# Patient Record
Sex: FEMALE | Race: WHITE | NOT HISPANIC OR LATINO | Employment: FULL TIME | URBAN - METROPOLITAN AREA
[De-identification: names, ages, dates, MRNs, and addresses within clinical notes are randomized per-mention and may not be internally consistent; named-entity substitution may affect disease eponyms.]

---

## 2023-09-06 ENCOUNTER — HOSPITAL ENCOUNTER (OUTPATIENT)
Dept: DATA CONVERSION | Facility: HOSPITAL | Age: 48
End: 2023-09-06

## 2023-09-06 DIAGNOSIS — Z12.31 ENCOUNTER FOR SCREENING MAMMOGRAM FOR MALIGNANT NEOPLASM OF BREAST: ICD-10-CM

## 2023-09-18 PROBLEM — N95.1 PERIMENOPAUSE: Status: ACTIVE | Noted: 2023-09-18

## 2023-09-18 PROBLEM — Z86.010 HISTORY OF COLON POLYPS: Status: ACTIVE | Noted: 2023-09-18

## 2023-09-18 PROBLEM — N92.6 IRREGULAR MENSES: Status: ACTIVE | Noted: 2023-09-18

## 2023-09-18 PROBLEM — I83.813 VARICOSE VEINS OF BOTH LOWER EXTREMITIES WITH PAIN: Status: ACTIVE | Noted: 2023-09-18

## 2023-09-18 PROBLEM — K57.30 DIVERTICULOSIS OF LARGE INTESTINE: Status: ACTIVE | Noted: 2023-09-18

## 2023-09-18 PROBLEM — R10.9 FLANK PAIN: Status: ACTIVE | Noted: 2023-09-18

## 2023-09-18 PROBLEM — R00.2 PALPITATIONS: Status: ACTIVE | Noted: 2023-09-18

## 2023-09-18 PROBLEM — M47.816 LUMBAR SPONDYLOSIS: Status: ACTIVE | Noted: 2023-09-18

## 2023-09-18 PROBLEM — M19.032 ARTHRITIS OF SCAPHOID-TRAPEZIUM-TRAPEZOID JOINT OF LEFT HAND: Status: ACTIVE | Noted: 2023-09-18

## 2023-09-18 PROBLEM — E78.2 MIXED HYPERLIPIDEMIA: Status: ACTIVE | Noted: 2023-09-18

## 2023-09-18 PROBLEM — Z86.0100 HISTORY OF COLON POLYPS: Status: ACTIVE | Noted: 2023-09-18

## 2023-09-18 PROBLEM — E78.1 HYPERTRIGLYCERIDEMIA: Status: ACTIVE | Noted: 2023-09-18

## 2023-09-18 PROBLEM — S39.012A LUMBAR STRAIN: Status: ACTIVE | Noted: 2023-09-18

## 2023-09-18 PROBLEM — I34.1 MITRAL VALVE PROLAPSE: Status: ACTIVE | Noted: 2023-09-18

## 2023-09-18 RX ORDER — CHOLECALCIFEROL (VITAMIN D3) 50 MCG
50 TABLET ORAL
COMMUNITY
End: 2024-01-03 | Stop reason: WASHOUT

## 2023-09-18 RX ORDER — MULTIVITAMIN WITH IRON
TABLET ORAL
COMMUNITY
End: 2024-01-03 | Stop reason: WASHOUT

## 2023-09-18 RX ORDER — LANOLIN ALCOHOL/MO/W.PET/CERES
CREAM (GRAM) TOPICAL
COMMUNITY
Start: 2022-10-15 | End: 2024-01-03 | Stop reason: WASHOUT

## 2023-09-18 RX ORDER — CELECOXIB 200 MG/1
200 CAPSULE ORAL
COMMUNITY
Start: 2023-03-23 | End: 2024-01-03 | Stop reason: WASHOUT

## 2023-09-18 RX ORDER — ASCORBIC ACID 500 MG
TABLET ORAL
COMMUNITY
End: 2024-01-03 | Stop reason: WASHOUT

## 2023-09-18 RX ORDER — FOLIC ACID 1 MG/1
TABLET ORAL
COMMUNITY
Start: 2022-10-15 | End: 2024-01-03 | Stop reason: WASHOUT

## 2023-09-18 RX ORDER — TIZANIDINE 4 MG/1
4 TABLET ORAL AS NEEDED
COMMUNITY
Start: 2023-03-23 | End: 2024-01-03 | Stop reason: WASHOUT

## 2023-09-18 RX ORDER — DIAPER,BRIEF,ADULT, DISPOSABLE
EACH MISCELLANEOUS
COMMUNITY
End: 2024-01-03 | Stop reason: WASHOUT

## 2023-09-18 RX ORDER — IRON SUCROSE 20 MG/ML
INJECTION, SOLUTION INTRAVENOUS
COMMUNITY
Start: 2022-10-15 | End: 2024-01-03 | Stop reason: WASHOUT

## 2023-10-11 ENCOUNTER — ANCILLARY PROCEDURE (OUTPATIENT)
Dept: RADIOLOGY | Facility: CLINIC | Age: 48
End: 2023-10-11
Payer: COMMERCIAL

## 2023-10-11 VITALS — BODY MASS INDEX: 24.83 KG/M2 | HEIGHT: 65 IN | WEIGHT: 149 LBS

## 2023-10-11 DIAGNOSIS — Z12.31 ENCOUNTER FOR SCREENING MAMMOGRAM FOR MALIGNANT NEOPLASM OF BREAST: ICD-10-CM

## 2023-10-11 PROCEDURE — 77063 BREAST TOMOSYNTHESIS BI: CPT | Mod: 50

## 2024-01-02 ENCOUNTER — TELEPHONE (OUTPATIENT)
Dept: OBSTETRICS AND GYNECOLOGY | Facility: CLINIC | Age: 49
End: 2024-01-02
Payer: COMMERCIAL

## 2024-01-02 NOTE — TELEPHONE ENCOUNTER
Pt calling has annual 04/2024. Patient states since Saturday she has had dull achy pain in her lower pelvic area, patient states it is centralized in the middle. Patient denies any UTI symptoms. Patients last LMP was in November but states it is not uncommon for them to be irregular. Patient states her partner had a vasectomy years ago. Patient denies any changes in her bowel movements. Please advise

## 2024-01-03 ENCOUNTER — OFFICE VISIT (OUTPATIENT)
Dept: OBSTETRICS AND GYNECOLOGY | Facility: CLINIC | Age: 49
End: 2024-01-03
Payer: COMMERCIAL

## 2024-01-03 VITALS
HEIGHT: 66 IN | DIASTOLIC BLOOD PRESSURE: 77 MMHG | BODY MASS INDEX: 25.2 KG/M2 | SYSTOLIC BLOOD PRESSURE: 123 MMHG | WEIGHT: 156.8 LBS

## 2024-01-03 DIAGNOSIS — Z11.51 SCREENING FOR HPV (HUMAN PAPILLOMAVIRUS): ICD-10-CM

## 2024-01-03 DIAGNOSIS — N95.1 PERIMENOPAUSE: ICD-10-CM

## 2024-01-03 DIAGNOSIS — R10.2 PELVIC PAIN: ICD-10-CM

## 2024-01-03 DIAGNOSIS — Z01.419 WELL WOMAN EXAM WITH ROUTINE GYNECOLOGICAL EXAM: Primary | ICD-10-CM

## 2024-01-03 DIAGNOSIS — N92.6 IRREGULAR MENSES: ICD-10-CM

## 2024-01-03 DIAGNOSIS — Z12.31 ENCOUNTER FOR SCREENING MAMMOGRAM FOR BREAST CANCER: ICD-10-CM

## 2024-01-03 PROCEDURE — 1036F TOBACCO NON-USER: CPT | Performed by: OBSTETRICS & GYNECOLOGY

## 2024-01-03 PROCEDURE — 87624 HPV HI-RISK TYP POOLED RSLT: CPT | Performed by: OBSTETRICS & GYNECOLOGY

## 2024-01-03 PROCEDURE — 88175 CYTOPATH C/V AUTO FLUID REDO: CPT | Mod: TC | Performed by: OBSTETRICS & GYNECOLOGY

## 2024-01-03 PROCEDURE — 99396 PREV VISIT EST AGE 40-64: CPT | Performed by: OBSTETRICS & GYNECOLOGY

## 2024-01-03 RX ORDER — BISMUTH SUBSALICYLATE 262 MG
1 TABLET,CHEWABLE ORAL DAILY
COMMUNITY

## 2024-01-03 ASSESSMENT — ENCOUNTER SYMPTOMS
COLOR CHANGE: 0
DYSURIA: 0
UNEXPECTED WEIGHT CHANGE: 0
CHEST TIGHTNESS: 0
SHORTNESS OF BREATH: 0
DIFFICULTY URINATING: 0
ACTIVITY CHANGE: 0
ABDOMINAL PAIN: 0
DIZZINESS: 0
FATIGUE: 0
LOSS OF SENSATION IN FEET: 0
ADENOPATHY: 0
HEADACHES: 0
DEPRESSION: 0
JOINT SWELLING: 0
ABDOMINAL DISTENTION: 0
WEAKNESS: 0
OCCASIONAL FEELINGS OF UNSTEADINESS: 0

## 2024-01-03 ASSESSMENT — LIFESTYLE VARIABLES
SKIP TO QUESTIONS 9-10: 0
HOW OFTEN DO YOU HAVE SIX OR MORE DRINKS ON ONE OCCASION: NEVER
HOW MANY STANDARD DRINKS CONTAINING ALCOHOL DO YOU HAVE ON A TYPICAL DAY: 3 OR 4
HOW OFTEN DO YOU HAVE A DRINK CONTAINING ALCOHOL: 2-3 TIMES A WEEK
AUDIT-C TOTAL SCORE: 4

## 2024-01-03 ASSESSMENT — PATIENT HEALTH QUESTIONNAIRE - PHQ9
1. LITTLE INTEREST OR PLEASURE IN DOING THINGS: SEVERAL DAYS
2. FEELING DOWN, DEPRESSED OR HOPELESS: SEVERAL DAYS
10. IF YOU CHECKED OFF ANY PROBLEMS, HOW DIFFICULT HAVE THESE PROBLEMS MADE IT FOR YOU TO DO YOUR WORK, TAKE CARE OF THINGS AT HOME, OR GET ALONG WITH OTHER PEOPLE: NOT DIFFICULT AT ALL
SUM OF ALL RESPONSES TO PHQ9 QUESTIONS 1 & 2: 2

## 2024-01-03 ASSESSMENT — PAIN SCALES - GENERAL: PAINLEVEL: 1

## 2024-01-03 NOTE — PROGRESS NOTES
"Annual  Subjective   Tanika Buckner is a 48 y.o. female who is here for annual exam w    Complaints: pos for almost 1 wk pelvic ache; reports a vague ache or pulling sensation over her old  incision with certain movements.  Interval history positive for rectal hemorrhaging requiring hospitalization, blood replacement ,and hemorrhoidal banding.  States no further episodes   last gyn exam 2022 for  new onset irregular menses=reg up til feb, then nothing til -.  FSH completed and elevated at 45.  Since then, menses have been coming approximately every 3 to 4 months; denies excessively heavy clotty or protracted vaginal bleeding.         Mother  menopausal early as 45/46, then stopped menses by 48.  Gyn History  Last pap  2020- BOTH pap/hpv neg;  2010 lgsil, hpv neg.   Mamm 2021 neg   Pelvic Ultrasound 2017 normal.   Birth control vasectomy.   Periods : see above; irreg since feb.   Menarche 14. LMP 2023, prev 2023  currently sexually active;same partner since ; denies exposure concerns.   Total pregnancies  2.   Pregnancy # 1:  2009 FEMALE \"MIRNA\" , Primary C/S.   Pregnancy # 2:   MALE \"GEORGINA PENA\", Repeat C/S.   Review of Systems   Constitutional:  Negative for activity change, fatigue and unexpected weight change.   Respiratory:  Negative for chest tightness and shortness of breath.    Cardiovascular:  Negative for chest pain and leg swelling.   Gastrointestinal:  Negative for abdominal distention and abdominal pain.   Genitourinary:  Negative for difficulty urinating, dysuria, genital sores, pelvic pain, vaginal bleeding, vaginal discharge and vaginal pain.   Musculoskeletal:  Negative for gait problem and joint swelling.   Skin:  Negative for color change and rash.   Neurological:  Negative for dizziness, weakness and headaches.   Hematological:  Negative for adenopathy.   Objective  Visit Vitals  /77   Ht 1.664 m (5' 5.5\")   Wt 71.1 kg (156 lb " 12.8 oz)   BMI 25.70 kg/m²   OB Status Perimenopausal   Smoking Status Never   BSA 1.81 m²       General:   Alert and oriented, in no acute distress   Neck: Supple. No visible thyromegaly.    Breast/Axilla: Normal to palpation bilaterally without masses, skin changes, or nipple discharge.    Abdomen: Soft, non-tender, without masses or organomegaly; Pfannenstiel incision appears well-healed.  There is no discoloration there is no palpable defect.  Patient displays no significant tenderness on exam today.   Vulva: Normal architecture without erythema, masses, or lesions.    Vagina: Normal mucosa without lesions, masses, or atrophy. No abnormal vaginal discharge.    Cervix: Normal without masses, lesions, or signs of cervicitis; Pap sent   Uterus: Normal, mobile, non-enlarged uterus   Adnexa: No palpable  masses or tenderness   Pelvic Floor normal   Psych Normal affect. Normal mood.    Assessment/Plan    Encounter Diagnoses   Name Primary?    Well woman exam with routine gynecological exam; grossly normal breast and GYN exams Yes    Screening for HPV (human papillomavirus); Pap sent     Pelvic pain; symptoms mild and improving; patient declines ultrasound at present but does agree to call if symptoms would persist or worsen.     Encounter for screening mammogram for breast cancer; order given     Perimenopause; known elevated FSH; family history of early menopause.  Reviewed typical transition.     Irregular menses; declines cycle regulation at present.  Agrees to call if any heavy or prolonged bleeding episodes.    Neela Watts MD

## 2024-01-18 LAB
CYTOLOGY CMNT CVX/VAG CYTO-IMP: NORMAL
HPV HR 12 DNA GENITAL QL NAA+PROBE: NEGATIVE
HPV HR GENOTYPES PNL CVX NAA+PROBE: NEGATIVE
HPV16 DNA SPEC QL NAA+PROBE: NEGATIVE
HPV18 DNA SPEC QL NAA+PROBE: NEGATIVE
LAB AP HPV GENOTYPE QUESTION: YES
LAB AP HPV HR: NORMAL
LABORATORY COMMENT REPORT: NORMAL
LMP START DATE: NORMAL
MENSTRUAL HX REPORTED: NORMAL
PATH REPORT.TOTAL CANCER: NORMAL

## 2024-04-10 ENCOUNTER — APPOINTMENT (OUTPATIENT)
Dept: OBSTETRICS AND GYNECOLOGY | Facility: CLINIC | Age: 49
End: 2024-04-10
Payer: COMMERCIAL

## 2024-04-25 ENCOUNTER — TELEPHONE (OUTPATIENT)
Dept: OBSTETRICS AND GYNECOLOGY | Facility: CLINIC | Age: 49
End: 2024-04-25
Payer: COMMERCIAL

## 2024-04-25 DIAGNOSIS — R10.2 PELVIC PAIN: Primary | ICD-10-CM

## 2024-04-25 DIAGNOSIS — Z80.41 FAMILY HISTORY OF OVARIAN CANCER: ICD-10-CM

## 2024-04-25 NOTE — TELEPHONE ENCOUNTER
Est pt last seen 01/04/2024 Annual / pt states that her sister was just dx with ovarian cancer / pt sisters MD advised advised to keep up with testing / Pt requesting test for ovarian ca / asked if she was inquiring about genetic testing / pt was not sure /pt thought that pap test can check for ovarian ca / explained pap test is for cervical cancer /  pt requesting message to Dr Watts for recommendation for any type of preventive testing / sister dx of ovarian ca added to pt hx / message to wc

## 2024-05-06 ASSESSMENT — PROMIS GLOBAL HEALTH SCALE
RATE_SOCIAL_SATISFACTION: GOOD
RATE_AVERAGE_FATIGUE: MILD
RATE_PHYSICAL_HEALTH: GOOD
RATE_MENTAL_HEALTH: GOOD
RATE_AVERAGE_PAIN: 1
CARRYOUT_SOCIAL_ACTIVITIES: VERY GOOD
EMOTIONAL_PROBLEMS: SOMETIMES
RATE_GENERAL_HEALTH: VERY GOOD
CARRYOUT_PHYSICAL_ACTIVITIES: COMPLETELY
RATE_QUALITY_OF_LIFE: VERY GOOD

## 2024-05-08 ENCOUNTER — HOSPITAL ENCOUNTER (OUTPATIENT)
Dept: RADIOLOGY | Facility: HOSPITAL | Age: 49
Discharge: HOME | End: 2024-05-08
Payer: COMMERCIAL

## 2024-05-08 ENCOUNTER — OFFICE VISIT (OUTPATIENT)
Dept: PRIMARY CARE | Facility: CLINIC | Age: 49
End: 2024-05-08
Payer: COMMERCIAL

## 2024-05-08 VITALS
OXYGEN SATURATION: 98 % | DIASTOLIC BLOOD PRESSURE: 70 MMHG | BODY MASS INDEX: 23.95 KG/M2 | HEIGHT: 66 IN | RESPIRATION RATE: 16 BRPM | HEART RATE: 83 BPM | SYSTOLIC BLOOD PRESSURE: 102 MMHG | WEIGHT: 149 LBS

## 2024-05-08 DIAGNOSIS — D50.0 IRON DEFICIENCY ANEMIA DUE TO CHRONIC BLOOD LOSS: ICD-10-CM

## 2024-05-08 DIAGNOSIS — Z13.6 SCREENING FOR CARDIOVASCULAR CONDITION: ICD-10-CM

## 2024-05-08 DIAGNOSIS — Z13.29 SCREENING FOR HYPOTHYROIDISM: ICD-10-CM

## 2024-05-08 DIAGNOSIS — K64.1 GRADE II HEMORRHOIDS: ICD-10-CM

## 2024-05-08 DIAGNOSIS — R10.2 PELVIC PAIN: ICD-10-CM

## 2024-05-08 DIAGNOSIS — Z80.41 FAMILY HISTORY OF OVARIAN CANCER: ICD-10-CM

## 2024-05-08 DIAGNOSIS — Z00.00 ROUTINE GENERAL MEDICAL EXAMINATION AT A HEALTH CARE FACILITY: Primary | ICD-10-CM

## 2024-05-08 PROCEDURE — 99396 PREV VISIT EST AGE 40-64: CPT | Performed by: INTERNAL MEDICINE

## 2024-05-08 PROCEDURE — 76830 TRANSVAGINAL US NON-OB: CPT | Performed by: RADIOLOGY

## 2024-05-08 PROCEDURE — 76856 US EXAM PELVIC COMPLETE: CPT | Performed by: RADIOLOGY

## 2024-05-08 PROCEDURE — 76856 US EXAM PELVIC COMPLETE: CPT

## 2024-05-08 RX ORDER — PSYLLIUM HUSK 0.4 G
2 CAPSULE ORAL DAILY
COMMUNITY

## 2024-05-08 ASSESSMENT — ENCOUNTER SYMPTOMS
BLOOD IN STOOL: 1
FATIGUE: 0
NERVOUS/ANXIOUS: 0
ARTHRALGIAS: 0
ABDOMINAL PAIN: 0
DEPRESSION: 0
FREQUENCY: 0
DIZZINESS: 0
CONSTIPATION: 1
SINUS PAIN: 0
HEADACHES: 0
VOMITING: 0
NAUSEA: 0
COUGH: 0
APPETITE CHANGE: 0
RHINORRHEA: 0
WEAKNESS: 0
LOSS OF SENSATION IN FEET: 0
OCCASIONAL FEELINGS OF UNSTEADINESS: 0
FEVER: 0
SHORTNESS OF BREATH: 0
JOINT SWELLING: 0
PALPITATIONS: 0
SLEEP DISTURBANCE: 0
BACK PAIN: 1
CHILLS: 0
DIARRHEA: 0
DIFFICULTY URINATING: 0
ROS GI COMMENTS: HEMORRHOIDS
SORE THROAT: 0
HEMATURIA: 0

## 2024-05-08 ASSESSMENT — PATIENT HEALTH QUESTIONNAIRE - PHQ9
1. LITTLE INTEREST OR PLEASURE IN DOING THINGS: NOT AT ALL
2. FEELING DOWN, DEPRESSED OR HOPELESS: NOT AT ALL
SUM OF ALL RESPONSES TO PHQ9 QUESTIONS 1 AND 2: 0

## 2024-05-08 ASSESSMENT — PAIN SCALES - GENERAL: PAINLEVEL: 0-NO PAIN

## 2024-05-08 NOTE — PROGRESS NOTES
"Subjective   Patient ID: Tanika Buckner is a 49 y.o. female who presents for Annual Exam. Overall she is feeling well. Her sister was recently diagnosed with ovarian cancer and patient underwent pelvic US this morning. Her last period was 8/2023. Hx of hemorrhoids and underwent banding. Still reports blood in stool. Takes metamucil daily for constipation. Family hx of breast cancer, grandmother. She pulled a muscle in her L lower back when rowing. Sees ophthalmologist and dentist regularly.    Diagnostics Reviewed: 10/2023 mammogram nml. 11/2022 colonoscopy showed large internal and external hemorrhoids, diverticulitis.  Labs Reviewed: 1/2024 PAP nml, HPV neg.         Review of Systems   Constitutional:  Negative for appetite change, chills, fatigue and fever.   HENT:  Negative for ear pain, rhinorrhea, sinus pain and sore throat.    Eyes:  Negative for visual disturbance.   Respiratory:  Negative for cough and shortness of breath.    Cardiovascular:  Negative for chest pain and palpitations.   Gastrointestinal:  Positive for blood in stool and constipation. Negative for abdominal pain, diarrhea, nausea and vomiting.        Hemorrhoids   Genitourinary:  Negative for difficulty urinating, frequency and hematuria.   Musculoskeletal:  Positive for back pain. Negative for arthralgias and joint swelling.   Skin:  Negative for rash.   Neurological:  Negative for dizziness, weakness and headaches.   Psychiatric/Behavioral:  Negative for sleep disturbance. The patient is not nervous/anxious.        Objective   /70   Pulse 83   Resp 16   Ht 1.664 m (5' 5.5\")   Wt 67.6 kg (149 lb)   LMP 08/04/2023   SpO2 98%   BMI 24.42 kg/m²     Physical Exam  HENT:      Right Ear: Tympanic membrane normal.      Left Ear: Tympanic membrane normal.      Mouth/Throat:      Pharynx: Oropharynx is clear. No oropharyngeal exudate.   Eyes:      Conjunctiva/sclera: Conjunctivae normal.      Pupils: Pupils are equal, round, and " reactive to light.   Neck:      Thyroid: No thyromegaly.      Vascular: No carotid bruit.   Cardiovascular:      Rate and Rhythm: Regular rhythm.      Heart sounds: Normal heart sounds.   Pulmonary:      Breath sounds: Normal breath sounds.   Abdominal:      Palpations: Abdomen is soft. There is no hepatomegaly.      Tenderness: There is no abdominal tenderness.   Musculoskeletal:      Right hip: Normal.      Left hip: Normal.      Right knee: Normal.      Left knee: Normal.      Right lower leg: No edema.      Left lower leg: No edema.   Lymphadenopathy:      Cervical: No cervical adenopathy.   Skin:     General: Skin is warm.      Comments: No suspicious moles   Neurological:      Mental Status: She is alert and oriented to person, place, and time.      Gait: Gait is intact.   Psychiatric:         Mood and Affect: Mood and affect normal.         Behavior: Behavior normal. Behavior is cooperative.         Cognition and Memory: Cognition normal.         Assessment/Plan   Diagnoses and all orders for this visit:  Grade II hemorrhoids  -     CBC and Auto Differential; Future  -     Comprehensive Metabolic Panel; Future  -     Vitamin B12; Future  -     Iron and TIBC; Future  -     Folate; Future  -     Ferritin; Future  -     TSH with reflex to Free T4 if abnormal; Future  -     Lipid Panel; Future  Iron deficiency anemia due to chronic blood loss  -     CBC and Auto Differential; Future  -     Comprehensive Metabolic Panel; Future  -     Vitamin B12; Future  -     Iron and TIBC; Future  -     Folate; Future  -     Ferritin; Future  -     TSH with reflex to Free T4 if abnormal; Future  -     Lipid Panel; Future  Routine general medical examination at a health care facility  -     CBC and Auto Differential; Future  -     Comprehensive Metabolic Panel; Future  -     Vitamin B12; Future  -     Iron and TIBC; Future  -     Folate; Future  -     Ferritin; Future  -     TSH with reflex to Free T4 if abnormal; Future  -      Lipid Panel; Future  Screening for cardiovascular condition  -     CBC and Auto Differential; Future  -     Comprehensive Metabolic Panel; Future  -     Vitamin B12; Future  -     Iron and TIBC; Future  -     Folate; Future  -     Ferritin; Future  -     TSH with reflex to Free T4 if abnormal; Future  -     Lipid Panel; Future  Screening for hypothyroidism  -     CBC and Auto Differential; Future  -     Comprehensive Metabolic Panel; Future  -     Vitamin B12; Future  -     Iron and TIBC; Future  -     Folate; Future  -     Ferritin; Future  -     TSH with reflex to Free T4 if abnormal; Future  -     Lipid Panel; Future  Family history of ovarian cancer  -     CancerNext-Expanded; Future  -     CBC and Auto Differential; Future  -     Comprehensive Metabolic Panel; Future  -     Vitamin B12; Future  -     Iron and TIBC; Future  -     Folate; Future  -     Ferritin; Future  -     TSH with reflex to Free T4 if abnormal; Future  -     Lipid Panel; Future      Scribe Attestation  By signing my name below, Carli FLYNN Scribe   attest that this documentation has been prepared under the direction and in the presence of Alpa Lin MD.

## 2024-05-15 ENCOUNTER — LAB (OUTPATIENT)
Dept: LAB | Facility: LAB | Age: 49
End: 2024-05-15
Payer: COMMERCIAL

## 2024-05-15 DIAGNOSIS — Z80.41 FAMILY HISTORY OF OVARIAN CANCER: ICD-10-CM

## 2024-05-15 DIAGNOSIS — D50.0 IRON DEFICIENCY ANEMIA DUE TO CHRONIC BLOOD LOSS: ICD-10-CM

## 2024-05-15 DIAGNOSIS — K64.1 GRADE II HEMORRHOIDS: ICD-10-CM

## 2024-05-15 DIAGNOSIS — Z13.29 SCREENING FOR HYPOTHYROIDISM: ICD-10-CM

## 2024-05-15 DIAGNOSIS — Z13.6 SCREENING FOR CARDIOVASCULAR CONDITION: ICD-10-CM

## 2024-05-15 DIAGNOSIS — Z00.00 ROUTINE GENERAL MEDICAL EXAMINATION AT A HEALTH CARE FACILITY: ICD-10-CM

## 2024-05-15 LAB
ALBUMIN SERPL-MCNC: 4.5 G/DL (ref 3.5–5)
ALP BLD-CCNC: 92 U/L (ref 35–125)
ALT SERPL-CCNC: 16 U/L (ref 5–40)
ANION GAP SERPL CALC-SCNC: 11 MMOL/L
AST SERPL-CCNC: 18 U/L (ref 5–40)
BASOPHILS # BLD AUTO: 0.03 X10*3/UL (ref 0–0.1)
BASOPHILS NFR BLD AUTO: 0.8 %
BILIRUB SERPL-MCNC: 0.3 MG/DL (ref 0.1–1.2)
BUN SERPL-MCNC: 13 MG/DL (ref 8–25)
CALCIUM SERPL-MCNC: 9.6 MG/DL (ref 8.5–10.4)
CHLORIDE SERPL-SCNC: 104 MMOL/L (ref 97–107)
CHOLEST SERPL-MCNC: 149 MG/DL (ref 133–200)
CHOLEST/HDLC SERPL: 2.4 {RATIO}
CO2 SERPL-SCNC: 26 MMOL/L (ref 24–31)
CREAT SERPL-MCNC: 0.9 MG/DL (ref 0.4–1.6)
EGFRCR SERPLBLD CKD-EPI 2021: 79 ML/MIN/1.73M*2
EOSINOPHIL # BLD AUTO: 0.08 X10*3/UL (ref 0–0.7)
EOSINOPHIL NFR BLD AUTO: 2.1 %
ERYTHROCYTE [DISTWIDTH] IN BLOOD BY AUTOMATED COUNT: 12.4 % (ref 11.5–14.5)
FERRITIN SERPL-MCNC: 13 NG/ML (ref 13–150)
FOLATE SERPL-MCNC: >20 NG/ML (ref 4.2–19.9)
GLUCOSE SERPL-MCNC: 111 MG/DL (ref 65–99)
HCT VFR BLD AUTO: 36.4 % (ref 36–46)
HDLC SERPL-MCNC: 63 MG/DL
HGB BLD-MCNC: 11.7 G/DL (ref 12–16)
IMM GRANULOCYTES # BLD AUTO: 0 X10*3/UL (ref 0–0.7)
IMM GRANULOCYTES NFR BLD AUTO: 0 % (ref 0–0.9)
IRON SATN MFR SERPL: 7 % (ref 12–50)
IRON SERPL-MCNC: 23 UG/DL (ref 30–160)
LDLC SERPL CALC-MCNC: 60 MG/DL (ref 65–130)
LYMPHOCYTES # BLD AUTO: 1.46 X10*3/UL (ref 1.2–4.8)
LYMPHOCYTES NFR BLD AUTO: 38.1 %
MCH RBC QN AUTO: 28.6 PG (ref 26–34)
MCHC RBC AUTO-ENTMCNC: 32.1 G/DL (ref 32–36)
MCV RBC AUTO: 89 FL (ref 80–100)
MONOCYTES # BLD AUTO: 0.29 X10*3/UL (ref 0.1–1)
MONOCYTES NFR BLD AUTO: 7.6 %
NEUTROPHILS # BLD AUTO: 1.97 X10*3/UL (ref 1.2–7.7)
NEUTROPHILS NFR BLD AUTO: 51.4 %
NRBC BLD-RTO: 0 /100 WBCS (ref 0–0)
PLATELET # BLD AUTO: 341 X10*3/UL (ref 150–450)
POTASSIUM SERPL-SCNC: 4.6 MMOL/L (ref 3.4–5.1)
PROT SERPL-MCNC: 6.8 G/DL (ref 5.9–7.9)
RBC # BLD AUTO: 4.09 X10*6/UL (ref 4–5.2)
SODIUM SERPL-SCNC: 141 MMOL/L (ref 133–145)
TIBC SERPL-MCNC: 348 UG/DL (ref 228–428)
TRIGL SERPL-MCNC: 129 MG/DL (ref 40–150)
TSH SERPL DL<=0.05 MIU/L-ACNC: 1.13 MIU/L (ref 0.27–4.2)
UIBC SERPL-MCNC: 325 UG/DL (ref 110–370)
VIT B12 SERPL-MCNC: 671 PG/ML (ref 211–946)
WBC # BLD AUTO: 3.8 X10*3/UL (ref 4.4–11.3)

## 2024-05-15 PROCEDURE — 83550 IRON BINDING TEST: CPT

## 2024-05-15 PROCEDURE — 82607 VITAMIN B-12: CPT

## 2024-05-15 PROCEDURE — 85025 COMPLETE CBC W/AUTO DIFF WBC: CPT

## 2024-05-15 PROCEDURE — 82746 ASSAY OF FOLIC ACID SERUM: CPT

## 2024-05-15 PROCEDURE — 80053 COMPREHEN METABOLIC PANEL: CPT

## 2024-05-15 PROCEDURE — 36415 COLL VENOUS BLD VENIPUNCTURE: CPT

## 2024-05-15 PROCEDURE — 82728 ASSAY OF FERRITIN: CPT

## 2024-05-15 PROCEDURE — 83540 ASSAY OF IRON: CPT

## 2024-05-15 PROCEDURE — 84443 ASSAY THYROID STIM HORMONE: CPT

## 2024-05-15 PROCEDURE — 80061 LIPID PANEL: CPT

## 2024-06-03 ENCOUNTER — TELEPHONE (OUTPATIENT)
Dept: OBSTETRICS AND GYNECOLOGY | Facility: CLINIC | Age: 49
End: 2024-06-03
Payer: COMMERCIAL

## 2024-06-03 ASSESSMENT — ENCOUNTER SYMPTOMS
MYALGIAS: 1
DIARRHEA: 0
CONSTIPATION: 0
ABDOMINAL PAIN: 1
HEADACHES: 0
BELCHING: 0
VOMITING: 0
ANOREXIA: 0
FLATUS: 1
HEMATOCHEZIA: 0
DYSURIA: 0
WEIGHT LOSS: 0
FEVER: 1
FREQUENCY: 0
HEMATURIA: 0
NAUSEA: 0
ARTHRALGIAS: 1

## 2024-06-03 NOTE — TELEPHONE ENCOUNTER
Est pt last seen 01/03/2024 /Annual / pt calling states that on Friday has mild pelvic pain /was doing yard work over the weekend and pelvic pain was severe / pt states that pelvic pain gone but still feel slight discomfort /  Pt had pelvic us 04/25/2024 neg  / pt concerned due to sister with ovarian cancer / pt also states that she is having painful intercourse / pt has not use lubricant recently / advised need for Lubricant / pt states that she feels that she was not dry at time of intercourse  / advised  message to Dr Watts

## 2024-06-06 ENCOUNTER — OFFICE VISIT (OUTPATIENT)
Dept: OBSTETRICS AND GYNECOLOGY | Facility: CLINIC | Age: 49
End: 2024-06-06
Payer: COMMERCIAL

## 2024-06-06 VITALS
BODY MASS INDEX: 25.56 KG/M2 | WEIGHT: 153.4 LBS | DIASTOLIC BLOOD PRESSURE: 60 MMHG | SYSTOLIC BLOOD PRESSURE: 120 MMHG | HEIGHT: 65 IN

## 2024-06-06 DIAGNOSIS — N94.2 VAGINISMUS: ICD-10-CM

## 2024-06-06 DIAGNOSIS — N94.10 DYSPAREUNIA IN FEMALE: Primary | ICD-10-CM

## 2024-06-06 DIAGNOSIS — N89.8 VAGINAL DRYNESS: ICD-10-CM

## 2024-06-06 DIAGNOSIS — R10.2 SUPRAPUBIC PAIN: ICD-10-CM

## 2024-06-06 DIAGNOSIS — R10.2 PELVIC PAIN: ICD-10-CM

## 2024-06-06 LAB
POC APPEARANCE, URINE: CLEAR
POC BILIRUBIN, URINE: NEGATIVE
POC BLOOD, URINE: ABNORMAL
POC COLOR, URINE: YELLOW
POC GLUCOSE, URINE: NEGATIVE MG/DL
POC KETONES, URINE: NEGATIVE MG/DL
POC LEUKOCYTES, URINE: ABNORMAL
POC NITRITE,URINE: NEGATIVE
POC PH, URINE: 6 PH
POC PROTEIN, URINE: NEGATIVE MG/DL
POC SPECIFIC GRAVITY, URINE: 1.02
POC UROBILINOGEN, URINE: 0.2 EU/DL

## 2024-06-06 PROCEDURE — 99214 OFFICE O/P EST MOD 30 MIN: CPT | Performed by: OBSTETRICS & GYNECOLOGY

## 2024-06-06 PROCEDURE — 81003 URINALYSIS AUTO W/O SCOPE: CPT | Performed by: OBSTETRICS & GYNECOLOGY

## 2024-06-06 PROCEDURE — 87086 URINE CULTURE/COLONY COUNT: CPT | Performed by: OBSTETRICS & GYNECOLOGY

## 2024-06-06 RX ORDER — BUTYROSPERMUM PARKII(SHEA BUTTER), SIMMONDSIA CHINENSIS (JOJOBA) SEED OIL, ALOE BARBADENSIS LEAF EXTRACT .01; 1; 3.5 G/100G; G/100G; G/100G
250 LIQUID TOPICAL 2 TIMES DAILY
COMMUNITY

## 2024-06-06 RX ORDER — MULTIVIT-MIN/IRON/FOLIC ACID/K 18-600-40
CAPSULE ORAL EVERY 24 HOURS
COMMUNITY

## 2024-06-06 RX ORDER — ESTRADIOL 0.1 MG/G
CREAM VAGINAL
Qty: 42 G | Refills: 1 | Status: SHIPPED | OUTPATIENT
Start: 2024-06-06

## 2024-06-06 ASSESSMENT — LIFESTYLE VARIABLES
HOW OFTEN DO YOU HAVE A DRINK CONTAINING ALCOHOL: 2-4 TIMES A MONTH
HOW OFTEN DO YOU HAVE SIX OR MORE DRINKS ON ONE OCCASION: NEVER
HOW MANY STANDARD DRINKS CONTAINING ALCOHOL DO YOU HAVE ON A TYPICAL DAY: 1 OR 2
AUDIT-C TOTAL SCORE: 2
SKIP TO QUESTIONS 9-10: 1

## 2024-06-06 ASSESSMENT — PAIN SCALES - GENERAL: PAINLEVEL: 0-NO PAIN

## 2024-06-06 ASSESSMENT — ENCOUNTER SYMPTOMS
LOSS OF SENSATION IN FEET: 0
DEPRESSION: 0
OCCASIONAL FEELINGS OF UNSTEADINESS: 0

## 2024-06-06 ASSESSMENT — PATIENT HEALTH QUESTIONNAIRE - PHQ9
1. LITTLE INTEREST OR PLEASURE IN DOING THINGS: NOT AT ALL
2. FEELING DOWN, DEPRESSED OR HOPELESS: NOT AT ALL
SUM OF ALL RESPONSES TO PHQ9 QUESTIONS 1 & 2: 0

## 2024-06-06 NOTE — PROGRESS NOTES
Tanika Buckner comes in for gyn concern of suprapubic pain--near old Pfannensteil incision/pain w insertion during sex, just past yr/dry.         EXAM:   Constitutional/general:Well developed/Well nourished/ BMI 25   Neuro/psych:  Oriented x 3;  Mood/affect-pleasant  ABD: NO palpable mass/defect along incision; non-tender today  Gyn:   External genitalia:no lesions/erythema  Urethra:nl  Vagina: pos vaginismus/able to relax w cueing; mucosa pale/thin; NO blood/exudate/lesions  Pelvic support: strong  Cervix: no blood/exudate/lesions  Uterus: nl size/shape  Adnexa: NO palp mass/tenderness today  Anus/perineum: nl  Answers submitted by the patient for this visit:  Abdominal Pain Questionnaire (Submitted on 6/3/2024)  Chief Complaint: Abdominal pain  Chronicity: new  Onset: in the past 7 days  Onset quality: gradual  Frequency: constantly  Episode duration: 4 Days  Progression since onset: gradually improving  Pain location: suprapubic region  Pain - numeric: 3/10  Pain quality: cramping, a sensation of fullness, sharp  Radiates to: back  anorexia: No  arthralgias: Yes  belching: No  constipation: No  diarrhea: No  dysuria: No  fever: Yes  flatus: Yes  frequency: No  headaches: No  hematochezia: No  hematuria: No  melena: No  myalgias: Yes  nausea: No  weight loss: No  vomiting: No  Aggravated by: nothing  Relieved by: nothing  Diagnostic workup: ultrasound  Office UA : pos trace leuks/blood; neg nitrates; will send culture      Encounter Diagnoses   Name Primary?    Dyspareunia in female; exam confirm mucosal atrophy; explained tissue changes are permanent and progressive=requires regular lube/moisturizing/rx tx ert; pt agreeable. Yes    Vaginismus; explained reflex response to pain/anticipation pain; expected to improve once mucosa adequately treated and coital comfort improves. Explained need for mechanical dilation.     Vaginal dryness; as above     Pelvic pain; now minimal; no mass or infection; possible abd wall  adhesions.     Suprapubic pain; mostly resolved; will send urine culture to complete jose gutierrez MD

## 2024-06-07 LAB — SCAN RESULT: NORMAL

## 2024-06-08 LAB — BACTERIA UR CULT: NO GROWTH

## 2024-06-20 ENCOUNTER — TELEPHONE (OUTPATIENT)
Dept: OBSTETRICS AND GYNECOLOGY | Facility: CLINIC | Age: 49
End: 2024-06-20
Payer: COMMERCIAL

## 2024-06-20 NOTE — TELEPHONE ENCOUNTER
Patient left  with update regarding lower abd pain she was having. States she was starting to feel better then Saturday the pain came back, Tuesday it was really intense. Her PCP advised US but one was already done and she can't get into GI until September. She wanted to know if you had any other suggestions for her?

## 2024-09-04 ENCOUNTER — APPOINTMENT (OUTPATIENT)
Dept: GASTROENTEROLOGY | Facility: CLINIC | Age: 49
End: 2024-09-04
Payer: COMMERCIAL

## 2024-09-04 DIAGNOSIS — Z12.11 ENCOUNTER FOR SCREENING COLONOSCOPY: ICD-10-CM

## 2024-09-04 DIAGNOSIS — K64.8 OTHER HEMORRHOIDS: ICD-10-CM

## 2024-09-04 DIAGNOSIS — K57.30 DIVERTICULOSIS OF LARGE INTESTINE WITHOUT HEMORRHAGE: ICD-10-CM

## 2024-09-04 DIAGNOSIS — K92.1 HEMATOCHEZIA: Primary | ICD-10-CM

## 2024-09-04 PROCEDURE — 99213 OFFICE O/P EST LOW 20 MIN: CPT | Performed by: STUDENT IN AN ORGANIZED HEALTH CARE EDUCATION/TRAINING PROGRAM

## 2024-09-04 NOTE — PROGRESS NOTES
Chief Complaint:  Chief Complaint   Patient presents with    Hematochezia       Called for follow-up. Last seen > 1 yr ago.     Has flares of abdominal pain with bleeding. No recent episodes.    Described as central abdominal pain with associated bleeding described as bright red blood and large volume  Denies hard stool, straining or pain w defecation.   Saw GYN in June for pelvic pain and dyspareunia     BM daily, bristol 4,   Rare excessive toilet time    Hx of hemorrhoids s/p banding  Colonoscopy  2022 for hematochezia. BBPS 9, diverticulosis and large IH/EH bleeding.   2018 w no polyps but hx of TA.       Virtual or Telephone Consent    An interactive audio and video telecommunication system which permits real time communications between the patient (at the originating site) and provider (at the distant site) was utilized to provide this telehealth service.   Verbal consent was requested and obtained from Tanika Buckner on this date, 09/04/24 for a telehealth visit.                  Review of Systems   Constitutional:  Negative for chills, fever and unexpected weight change.   HENT:  Negative for congestion and trouble swallowing.    Respiratory:  Negative for cough, shortness of breath and wheezing.    Cardiovascular:  Negative for chest pain.   Gastrointestinal:  Positive for abdominal pain and anal bleeding. Negative for abdominal distention, constipation, diarrhea, nausea and vomiting.   Genitourinary:  Negative for difficulty urinating.   Musculoskeletal:  Negative for arthralgias and joint swelling.   Skin:  Negative for color change.   Neurological:  Negative for dizziness, speech difficulty, light-headedness and headaches.   Psychiatric/Behavioral:  Negative for confusion and sleep disturbance.      Family History   Problem Relation Name Age of Onset    Hypertension Mother Hoda Royal     Other (varicose veins) Mother Hoda Royal     Other (open heart surgery) Mother Hoda Royal 46        x2 SHANNON     "Atrial fibrillation Mother Hoda Royal     Miscarriages / Stillbirths Mother Hoda Royal     Other (varicose veins) Father      Skin cancer Father      Cancer Sister Danya Cruz     Asthma Sister Danya Cruz     Ovarian cancer Sister Danya Cruz         dx 04/2024    Other (ovarian cancer) Sister Danya Cruz     Breast cancer Paternal Grandmother Shaquille Royal 45    Prostate cancer Paternal Grandfather      Arthritis Other grandfather     Breast cancer Other grandmother     Hypertension Other grandmother     Cancer Sister Danya        Medications    Current Outpatient Medications:     ascorbic acid, vitamin C, 500 mg capsule, Take by mouth once every 24 hours., Disp: , Rfl:     estradiol (Estrace) 0.01 % (0.1 mg/gram) vaginal cream, Apply pea sized amount cream to bottom of vaginal opening each night for 2 weeks,  then at bedtime twice a week., Disp: 42 g, Rfl: 1    multivitamin tablet, Take 1 tablet by mouth once daily. Numerous pill pack., Disp: , Rfl:     psyllium (Metamucil) 0.4 gram capsule, Take 2 capsules by mouth once daily., Disp: , Rfl:     saccharomyces boulardii (Florastor) 250 mg capsule, Take 1 capsule (250 mg) by mouth 2 times a day., Disp: , Rfl:     Vitals  There were no vitals taken for this visit.    Physical Exam  Constitutional:       General: She is not in acute distress.     Appearance: Normal appearance.   HENT:      Head: Normocephalic.      Nose: Nose normal.   Eyes:      General: No scleral icterus.     Extraocular Movements: Extraocular movements intact.      Conjunctiva/sclera: Conjunctivae normal.   Pulmonary:      Effort: Pulmonary effort is normal.   Skin:     General: Skin is warm.      Coloration: Skin is not jaundiced.   Neurological:      Mental Status: She is alert.   Psychiatric:         Mood and Affect: Mood normal.           Labs:  No results found for: \"AFP\"No results found for: \"ASMAB\", \"MITOAB\"No results found for: \"LUISITO\"No results found for: \"ASMAB\", \"MITOAB\"  Lab " "Results   Component Value Date    MSJRVMDQ07 671 05/15/2024   No results found for: \"HEPCAB\"No results found for: \"HEPATOT\", \"HEPAIGM\", \"HEPBCIGM\", \"HEPBCAB\", \"HBEAG\", \"HEPCAB\"No results found for: \"HIV1X2\"  Lab Results   Component Value Date    IRON 23 (L) 05/15/2024    TIBC 348 05/15/2024    FERRITIN 13 05/15/2024   No results found for: \"INR\", \"PROTIME\"  Lab Results   Component Value Date    TSH 1.13 05/15/2024       Radiology  US PELVIS TRANSABDOMINAL WITH TRANSVAGINAL  Narrative: Interpreted By:  Chet Jay,   STUDY:  US PELVIS TRANSABDOMINAL WITH TRANSVAGINAL;  5/8/2024 9:23 am      INDICATION:  Signs/Symptoms:pelvic pain.      COMPARISON:  03/28/2017      ACCESSION NUMBER(S):  [id number]      ORDERING CLINICIAN:  KELLY LOVING      TECHNIQUE:  Multiple multiplanar static gray scale, color and spectral waveform  sonographic images of the pelvis were obtained.  Transabdominal and  endovaginal ultrasound was performed.      FINDINGS:  UTERUS:  The uterus measures 4.1 cm x 3.5 cm x 6.9 cmcm in greatest  transverse, AP, and sagittal dimensions. No discrete myometrial mass  identified on the provided images.      ENDOMETRIUM:  The central endometrial complex measures  0.2 cm cm in dual layer  thickness.  No fluid noted within the endometrial cavity.      RIGHT ADNEXA:  The right ovary measures  2.3 cm x 1.1 cm x 1.8 cmcm and demonstrates  flow on Doppler imaging.  It appears sonographically unremarkable.      LEFT ADNEXA:  The left ovary measures  1.8 cm x 0.7 cm x 1.8 cm cm and demonstrates  flow on Doppler imaging.  It appears sonographically unremarkable.      CUL DE SAC:  No significant free fluid.      Impression: Unremarkable pelvic ultrasound.      MACRO:  None.      Signed by: Chet Jay 5/8/2024 9:29 AM  Dictation workstation:   [id number]      A/P   Tanika was seen today for hematochezia.  Diagnoses and all orders for this visit:  Hematochezia (Primary)  Other hemorrhoids  Encounter for screening " colonoscopy  Diverticulosis of large intestine without hemorrhage     Problem List Items Addressed This Visit             ICD-10-CM    Diverticulosis of large intestine K57.30     Sigmoid diverticulosis seen on most recent colonoscopy. Asymptomatic   - discussed management of diverticulosis including avoiding constipation and adhering to high fiber diet. Do not need to avoid nuts/seeds/beans/corn   - discussed complications related to diverticulosis including diverticulitis and diverticular bleeding.   - patient can call the office if concerning symptoms          Other hemorrhoids K64.8     High fiber diet         Hematochezia - Primary K92.1     Intermittent rectal bleeding w periumbilical abdominal pain. Colon 2022 for rectal bleeding w diverticulosis and bleeding hemorrhoids, s/p banding.  DDX include diverticular vs hemorrhoids vs small bowel etiology  - high fiber diet  - avoid constipation  - if persistent or more frequent consider flexible sigmoidoscopy and/or VCE         Encounter for screening colonoscopy Z12.11     11/2022 and 2018 with no polyps  - recall 10 yrs --> 11/2032

## 2024-09-10 PROBLEM — Z12.11 ENCOUNTER FOR SCREENING COLONOSCOPY: Status: ACTIVE | Noted: 2024-09-10

## 2024-09-10 PROBLEM — K92.1 HEMATOCHEZIA: Status: ACTIVE | Noted: 2024-09-10

## 2024-09-10 PROBLEM — K64.8 OTHER HEMORRHOIDS: Status: ACTIVE | Noted: 2024-09-10

## 2024-09-10 ASSESSMENT — ENCOUNTER SYMPTOMS
ANAL BLEEDING: 1
CONSTIPATION: 0
DIFFICULTY URINATING: 0
ABDOMINAL PAIN: 1
SHORTNESS OF BREATH: 0
ABDOMINAL DISTENTION: 0
DIARRHEA: 0
ARTHRALGIAS: 0
VOMITING: 0
FEVER: 0
HEADACHES: 0
JOINT SWELLING: 0
UNEXPECTED WEIGHT CHANGE: 0
NAUSEA: 0
COUGH: 0
TROUBLE SWALLOWING: 0
LIGHT-HEADEDNESS: 0
DIZZINESS: 0
SPEECH DIFFICULTY: 0
COLOR CHANGE: 0
SLEEP DISTURBANCE: 0
WHEEZING: 0
CHILLS: 0
CONFUSION: 0

## 2024-09-10 NOTE — ASSESSMENT & PLAN NOTE
Intermittent rectal bleeding w periumbilical abdominal pain. Colon 2022 for rectal bleeding w diverticulosis and bleeding hemorrhoids, s/p banding.  DDX include diverticular vs hemorrhoids vs small bowel etiology  - high fiber diet  - avoid constipation  - if persistent or more frequent consider flexible sigmoidoscopy and/or VCE

## 2024-09-10 NOTE — ASSESSMENT & PLAN NOTE
Sigmoid diverticulosis seen on most recent colonoscopy. Asymptomatic   - discussed management of diverticulosis including avoiding constipation and adhering to high fiber diet. Do not need to avoid nuts/seeds/beans/corn   - discussed complications related to diverticulosis including diverticulitis and diverticular bleeding.   - patient can call the office if concerning symptoms

## 2024-09-11 ENCOUNTER — APPOINTMENT (OUTPATIENT)
Dept: GENETICS | Facility: CLINIC | Age: 49
End: 2024-09-11
Payer: COMMERCIAL

## 2024-10-15 ENCOUNTER — TELEPHONE (OUTPATIENT)
Dept: OBSTETRICS AND GYNECOLOGY | Facility: CLINIC | Age: 49
End: 2024-10-15
Payer: COMMERCIAL

## 2024-10-15 NOTE — TELEPHONE ENCOUNTER
Est pt last seen 06/06/2024 painful penetration / Pt calling states that she was working this Am and felt something / went to bathroom and had Nickel size spot of red blood went through underwear / and on tissue when wiping / none further / pt concerned as LMP 08/2023 / Sister dx with ovarian cancer this year / Pt last Pelvic US 05/08/2024 Neg/ advised msg to Dr Watts

## 2024-11-01 ENCOUNTER — OFFICE VISIT (OUTPATIENT)
Dept: OBSTETRICS AND GYNECOLOGY | Facility: CLINIC | Age: 49
End: 2024-11-01
Payer: COMMERCIAL

## 2024-11-01 VITALS
BODY MASS INDEX: 24.3 KG/M2 | WEIGHT: 151.2 LBS | HEIGHT: 66 IN | DIASTOLIC BLOOD PRESSURE: 72 MMHG | SYSTOLIC BLOOD PRESSURE: 104 MMHG

## 2024-11-01 DIAGNOSIS — N89.8 VAGINAL DRYNESS: ICD-10-CM

## 2024-11-01 DIAGNOSIS — K92.1 HEMATOCHEZIA: ICD-10-CM

## 2024-11-01 DIAGNOSIS — K64.8 OTHER HEMORRHOIDS: ICD-10-CM

## 2024-11-01 DIAGNOSIS — N95.0 POSTMENOPAUSAL BLEEDING: Primary | ICD-10-CM

## 2024-11-01 DIAGNOSIS — N90.89 PERINEAL FISSURE IN FEMALE: ICD-10-CM

## 2024-11-01 DIAGNOSIS — N95.1 PERIMENOPAUSE: ICD-10-CM

## 2024-11-01 PROCEDURE — 3008F BODY MASS INDEX DOCD: CPT | Performed by: OBSTETRICS & GYNECOLOGY

## 2024-11-01 PROCEDURE — 99213 OFFICE O/P EST LOW 20 MIN: CPT | Performed by: OBSTETRICS & GYNECOLOGY

## 2024-11-01 PROCEDURE — 1036F TOBACCO NON-USER: CPT | Performed by: OBSTETRICS & GYNECOLOGY

## 2024-11-01 ASSESSMENT — PATIENT HEALTH QUESTIONNAIRE - PHQ9
SUM OF ALL RESPONSES TO PHQ9 QUESTIONS 1 & 2: 0
2. FEELING DOWN, DEPRESSED OR HOPELESS: NOT AT ALL
1. LITTLE INTEREST OR PLEASURE IN DOING THINGS: NOT AT ALL

## 2024-11-01 ASSESSMENT — ENCOUNTER SYMPTOMS
LOSS OF SENSATION IN FEET: 0
DEPRESSION: 0
OCCASIONAL FEELINGS OF UNSTEADINESS: 0

## 2024-11-01 ASSESSMENT — LIFESTYLE VARIABLES
HOW MANY STANDARD DRINKS CONTAINING ALCOHOL DO YOU HAVE ON A TYPICAL DAY: 3 OR 4
SKIP TO QUESTIONS 9-10: 0
HOW OFTEN DO YOU HAVE SIX OR MORE DRINKS ON ONE OCCASION: NEVER
AUDIT-C TOTAL SCORE: 4
HOW OFTEN DO YOU HAVE A DRINK CONTAINING ALCOHOL: 2-3 TIMES A WEEK

## 2024-11-01 ASSESSMENT — PAIN SCALES - GENERAL: PAINLEVEL_OUTOF10: 0-NO PAIN

## 2024-12-29 ASSESSMENT — ENCOUNTER SYMPTOMS
WEAKNESS: 0
COLOR CHANGE: 0
DIZZINESS: 0
ACTIVITY CHANGE: 0
DYSURIA: 0
ABDOMINAL PAIN: 0
ADENOPATHY: 0
SHORTNESS OF BREATH: 0
HEADACHES: 0
FATIGUE: 0
DIFFICULTY URINATING: 0
ABDOMINAL DISTENTION: 0
JOINT SWELLING: 0
CHEST TIGHTNESS: 0

## 2024-12-29 NOTE — PROGRESS NOTES
"Annual  Subjective   Tanika Buckner is a 49 y.o. last seen 11/0/2024, who is here for a routine exam.   Complaints: Noting more difficulty in weight management ;denies any vag bleed or pelvic pain since last exam.   Newly engaged to partner past 8 yrs/[place].  sister under tx for ovarian cancer/pt completed [employer] genetic testing 5/17/2024 and NEG for mutations.  FSH 45.3= 2022   Last pap 01/2024 neg/hpv neg   Mamm 10/11/23: neg/heterog dense/TC 17.7; GM breast ca  Pelvic US 05/2024 to eval pain: neg/endo 2 mm; sister ovarian ca  Colonoscopy 11/2022 neg; rpt 10 yrs  Denies urinary/coital concerns; Uberlube working well.  Review of Systems   Constitutional:  Positive for unexpected weight change. Negative for activity change and fatigue.   Respiratory:  Negative for chest tightness and shortness of breath.    Cardiovascular:  Negative for chest pain and leg swelling.   Gastrointestinal:  Negative for abdominal distention and abdominal pain.   Genitourinary:  Negative for difficulty urinating, dysuria, genital sores, pelvic pain, vaginal bleeding, vaginal discharge and vaginal pain.   Musculoskeletal:  Negative for gait problem and joint swelling.   Skin:  Negative for color change and rash.   Neurological:  Negative for dizziness, weakness and headaches.   Hematological:  Negative for adenopathy.   Objective Visit Vitals  /76   Ht 1.651 m (5' 5\")   Wt 71.3 kg (157 lb 3.2 oz)   BMI 26.16 kg/m²   OB Status Menopausal   Smoking Status Never   BSA 1.81 m²       General:   Alert and oriented, in no acute distress   Neck: Supple. No visible thyromegaly.    Breast/Axilla: Normal to palpation bilaterally without masses, skin changes, or nipple discharge.    Abdomen: Pfanensteil incision; Soft, non-tender, without masses or organomegaly   Vulva: Normal architecture without erythema, masses, or lesions.    Vagina: Moderate vaginismus;  mucosa without lesions, masses, blood or atrophy. No abnormal vaginal discharge.  "   Cervix: Normal without masses, lesions, or signs of cervicitis   Uterus: Normal, mobile, non-enlarged uterus   Adnexa: No palpable  masses or tenderness; exam mildly limited by pelvic clenching ang abdom wall scarring from c/s.f   Pelvic Floor normal   Psych Normal affect. Normal mood.    Assessment/Plan   Encounter Diagnoses   Name Primary?    Well woman exam with routine gynecological exam; no suspicious findings on breast or pelvic exam Yes    Encounter for screening mammogram for malignant neoplasm of breast; order placed.     Menopause; reviewed natural changes in metabolism with needed adjustments in diet and exercise.     Postmenopausal atrophic vaginitis; patient reporting good relief with Uber lube     Screen for colon cancer; up-to-date and negative     Family history of ovarian cancer; have offered yearly ultrasound exam of pelvis due to limitations with clinical pelvic exam.     Neela Watts MD

## 2025-01-15 ENCOUNTER — OFFICE VISIT (OUTPATIENT)
Dept: OBSTETRICS AND GYNECOLOGY | Facility: CLINIC | Age: 50
End: 2025-01-15
Payer: COMMERCIAL

## 2025-01-15 VITALS
WEIGHT: 157.2 LBS | SYSTOLIC BLOOD PRESSURE: 122 MMHG | BODY MASS INDEX: 26.19 KG/M2 | DIASTOLIC BLOOD PRESSURE: 76 MMHG | HEIGHT: 65 IN

## 2025-01-15 DIAGNOSIS — Z80.41 FAMILY HISTORY OF OVARIAN CANCER: ICD-10-CM

## 2025-01-15 DIAGNOSIS — N95.2 POSTMENOPAUSAL ATROPHIC VAGINITIS: ICD-10-CM

## 2025-01-15 DIAGNOSIS — Z78.0 MENOPAUSE: ICD-10-CM

## 2025-01-15 DIAGNOSIS — R10.2 PELVIC PRESSURE IN FEMALE: ICD-10-CM

## 2025-01-15 DIAGNOSIS — Z12.31 ENCOUNTER FOR SCREENING MAMMOGRAM FOR MALIGNANT NEOPLASM OF BREAST: ICD-10-CM

## 2025-01-15 DIAGNOSIS — Z01.419 WELL WOMAN EXAM WITH ROUTINE GYNECOLOGICAL EXAM: Primary | ICD-10-CM

## 2025-01-15 DIAGNOSIS — Z12.11 SCREEN FOR COLON CANCER: ICD-10-CM

## 2025-01-15 PROCEDURE — 99396 PREV VISIT EST AGE 40-64: CPT | Performed by: OBSTETRICS & GYNECOLOGY

## 2025-01-15 PROCEDURE — 1036F TOBACCO NON-USER: CPT | Performed by: OBSTETRICS & GYNECOLOGY

## 2025-01-15 PROCEDURE — 3008F BODY MASS INDEX DOCD: CPT | Performed by: OBSTETRICS & GYNECOLOGY

## 2025-01-15 ASSESSMENT — LIFESTYLE VARIABLES
HOW MANY STANDARD DRINKS CONTAINING ALCOHOL DO YOU HAVE ON A TYPICAL DAY: 1 OR 2
HOW OFTEN DO YOU HAVE SIX OR MORE DRINKS ON ONE OCCASION: NEVER
HOW OFTEN DO YOU HAVE A DRINK CONTAINING ALCOHOL: 2-4 TIMES A MONTH
AUDIT-C TOTAL SCORE: 2
SKIP TO QUESTIONS 9-10: 1

## 2025-01-15 ASSESSMENT — ENCOUNTER SYMPTOMS
LOSS OF SENSATION IN FEET: 0
UNEXPECTED WEIGHT CHANGE: 1
DEPRESSION: 0
OCCASIONAL FEELINGS OF UNSTEADINESS: 0

## 2025-01-15 ASSESSMENT — PATIENT HEALTH QUESTIONNAIRE - PHQ9
1. LITTLE INTEREST OR PLEASURE IN DOING THINGS: NOT AT ALL
SUM OF ALL RESPONSES TO PHQ9 QUESTIONS 1 & 2: 0
2. FEELING DOWN, DEPRESSED OR HOPELESS: NOT AT ALL

## 2025-01-15 ASSESSMENT — PAIN SCALES - GENERAL: PAINLEVEL_OUTOF10: 0-NO PAIN

## 2025-02-12 ENCOUNTER — HOSPITAL ENCOUNTER (OUTPATIENT)
Dept: RADIOLOGY | Facility: HOSPITAL | Age: 50
Discharge: HOME | End: 2025-02-12
Payer: COMMERCIAL

## 2025-02-12 DIAGNOSIS — Z12.31 ENCOUNTER FOR SCREENING MAMMOGRAM FOR MALIGNANT NEOPLASM OF BREAST: ICD-10-CM

## 2025-02-12 PROCEDURE — 77063 BREAST TOMOSYNTHESIS BI: CPT

## 2025-02-12 PROCEDURE — 77067 SCR MAMMO BI INCL CAD: CPT | Performed by: RADIOLOGY

## 2025-02-12 PROCEDURE — 77063 BREAST TOMOSYNTHESIS BI: CPT | Performed by: RADIOLOGY

## 2025-03-03 ENCOUNTER — APPOINTMENT (OUTPATIENT)
Dept: CARDIOLOGY | Facility: HOSPITAL | Age: 50
End: 2025-03-03
Payer: COMMERCIAL

## 2025-03-03 ENCOUNTER — HOSPITAL ENCOUNTER (EMERGENCY)
Facility: HOSPITAL | Age: 50
Discharge: HOME | End: 2025-03-03
Attending: STUDENT IN AN ORGANIZED HEALTH CARE EDUCATION/TRAINING PROGRAM
Payer: COMMERCIAL

## 2025-03-03 ENCOUNTER — APPOINTMENT (OUTPATIENT)
Dept: RADIOLOGY | Facility: HOSPITAL | Age: 50
End: 2025-03-03
Payer: COMMERCIAL

## 2025-03-03 VITALS
SYSTOLIC BLOOD PRESSURE: 135 MMHG | BODY MASS INDEX: 24.11 KG/M2 | HEART RATE: 78 BPM | OXYGEN SATURATION: 98 % | RESPIRATION RATE: 16 BRPM | WEIGHT: 150 LBS | DIASTOLIC BLOOD PRESSURE: 92 MMHG | HEIGHT: 66 IN

## 2025-03-03 DIAGNOSIS — R07.9 CHEST PAIN, UNSPECIFIED TYPE: Primary | ICD-10-CM

## 2025-03-03 LAB
ALBUMIN SERPL BCP-MCNC: 4.3 G/DL (ref 3.4–5)
ALP SERPL-CCNC: 72 U/L (ref 33–110)
ALT SERPL W P-5'-P-CCNC: 18 U/L (ref 7–45)
ANION GAP SERPL CALCULATED.3IONS-SCNC: 10 MMOL/L (ref 10–20)
AST SERPL W P-5'-P-CCNC: 20 U/L (ref 9–39)
ATRIAL RATE: 89 BPM
BASOPHILS # BLD AUTO: 0.04 X10*3/UL (ref 0–0.1)
BASOPHILS NFR BLD AUTO: 0.9 %
BILIRUB SERPL-MCNC: 0.3 MG/DL (ref 0–1.2)
BUN SERPL-MCNC: 11 MG/DL (ref 6–23)
CALCIUM SERPL-MCNC: 9.5 MG/DL (ref 8.6–10.3)
CARDIAC TROPONIN I PNL SERPL HS: <3 NG/L (ref 0–13)
CARDIAC TROPONIN I PNL SERPL HS: <3 NG/L (ref 0–13)
CHLORIDE SERPL-SCNC: 107 MMOL/L (ref 98–107)
CO2 SERPL-SCNC: 26 MMOL/L (ref 21–32)
CREAT SERPL-MCNC: 0.79 MG/DL (ref 0.5–1.05)
EGFRCR SERPLBLD CKD-EPI 2021: >90 ML/MIN/1.73M*2
EOSINOPHIL # BLD AUTO: 0.14 X10*3/UL (ref 0–0.7)
EOSINOPHIL NFR BLD AUTO: 3 %
ERYTHROCYTE [DISTWIDTH] IN BLOOD BY AUTOMATED COUNT: 17.2 % (ref 11.5–14.5)
GLUCOSE SERPL-MCNC: 101 MG/DL (ref 74–99)
HCT VFR BLD AUTO: 36 % (ref 36–46)
HGB BLD-MCNC: 11.4 G/DL (ref 12–16)
IMM GRANULOCYTES # BLD AUTO: 0.01 X10*3/UL (ref 0–0.7)
IMM GRANULOCYTES NFR BLD AUTO: 0.2 % (ref 0–0.9)
LYMPHOCYTES # BLD AUTO: 1.13 X10*3/UL (ref 1.2–4.8)
LYMPHOCYTES NFR BLD AUTO: 24.1 %
MAGNESIUM SERPL-MCNC: 1.95 MG/DL (ref 1.6–2.4)
MCH RBC QN AUTO: 26.1 PG (ref 26–34)
MCHC RBC AUTO-ENTMCNC: 31.7 G/DL (ref 32–36)
MCV RBC AUTO: 83 FL (ref 80–100)
MONOCYTES # BLD AUTO: 0.39 X10*3/UL (ref 0.1–1)
MONOCYTES NFR BLD AUTO: 8.3 %
NEUTROPHILS # BLD AUTO: 2.97 X10*3/UL (ref 1.2–7.7)
NEUTROPHILS NFR BLD AUTO: 63.5 %
NRBC BLD-RTO: 0 /100 WBCS (ref 0–0)
P AXIS: 59 DEGREES
P OFFSET: 198 MS
P ONSET: 147 MS
PLATELET # BLD AUTO: 362 X10*3/UL (ref 150–450)
POTASSIUM SERPL-SCNC: 3.9 MMOL/L (ref 3.5–5.3)
PR INTERVAL: 146 MS
PROT SERPL-MCNC: 7.1 G/DL (ref 6.4–8.2)
Q ONSET: 220 MS
QRS COUNT: 15 BEATS
QRS DURATION: 80 MS
QT INTERVAL: 354 MS
QTC CALCULATION(BAZETT): 430 MS
QTC FREDERICIA: 403 MS
R AXIS: 71 DEGREES
RBC # BLD AUTO: 4.36 X10*6/UL (ref 4–5.2)
SODIUM SERPL-SCNC: 139 MMOL/L (ref 136–145)
T AXIS: 60 DEGREES
T OFFSET: 397 MS
VENTRICULAR RATE: 89 BPM
WBC # BLD AUTO: 4.7 X10*3/UL (ref 4.4–11.3)

## 2025-03-03 PROCEDURE — 80053 COMPREHEN METABOLIC PANEL: CPT | Performed by: STUDENT IN AN ORGANIZED HEALTH CARE EDUCATION/TRAINING PROGRAM

## 2025-03-03 PROCEDURE — 83735 ASSAY OF MAGNESIUM: CPT | Performed by: STUDENT IN AN ORGANIZED HEALTH CARE EDUCATION/TRAINING PROGRAM

## 2025-03-03 PROCEDURE — 71045 X-RAY EXAM CHEST 1 VIEW: CPT

## 2025-03-03 PROCEDURE — 93005 ELECTROCARDIOGRAM TRACING: CPT

## 2025-03-03 PROCEDURE — 85025 COMPLETE CBC W/AUTO DIFF WBC: CPT | Performed by: STUDENT IN AN ORGANIZED HEALTH CARE EDUCATION/TRAINING PROGRAM

## 2025-03-03 PROCEDURE — 84484 ASSAY OF TROPONIN QUANT: CPT | Performed by: STUDENT IN AN ORGANIZED HEALTH CARE EDUCATION/TRAINING PROGRAM

## 2025-03-03 PROCEDURE — 36415 COLL VENOUS BLD VENIPUNCTURE: CPT | Performed by: STUDENT IN AN ORGANIZED HEALTH CARE EDUCATION/TRAINING PROGRAM

## 2025-03-03 PROCEDURE — 99285 EMERGENCY DEPT VISIT HI MDM: CPT | Performed by: STUDENT IN AN ORGANIZED HEALTH CARE EDUCATION/TRAINING PROGRAM

## 2025-03-03 PROCEDURE — 71045 X-RAY EXAM CHEST 1 VIEW: CPT | Performed by: RADIOLOGY

## 2025-03-03 ASSESSMENT — PAIN DESCRIPTION - DESCRIPTORS: DESCRIPTORS: TIGHTNESS

## 2025-03-03 ASSESSMENT — PAIN - FUNCTIONAL ASSESSMENT: PAIN_FUNCTIONAL_ASSESSMENT: 0-10

## 2025-03-03 ASSESSMENT — HEART SCORE
TROPONIN: LESS THAN OR EQUAL TO NORMAL LIMIT
ECG: NORMAL
HISTORY: MODERATELY SUSPICIOUS
RISK FACTORS: 1-2 RISK FACTORS
AGE: 45-64
HEART SCORE: 3

## 2025-03-03 ASSESSMENT — PAIN DESCRIPTION - FREQUENCY: FREQUENCY: CONSTANT/CONTINUOUS

## 2025-03-03 ASSESSMENT — COLUMBIA-SUICIDE SEVERITY RATING SCALE - C-SSRS
1. IN THE PAST MONTH, HAVE YOU WISHED YOU WERE DEAD OR WISHED YOU COULD GO TO SLEEP AND NOT WAKE UP?: NO
2. HAVE YOU ACTUALLY HAD ANY THOUGHTS OF KILLING YOURSELF?: NO
6. HAVE YOU EVER DONE ANYTHING, STARTED TO DO ANYTHING, OR PREPARED TO DO ANYTHING TO END YOUR LIFE?: NO

## 2025-03-03 ASSESSMENT — PAIN DESCRIPTION - PAIN TYPE: TYPE: ACUTE PAIN

## 2025-03-03 ASSESSMENT — PAIN DESCRIPTION - LOCATION: LOCATION: CHEST

## 2025-03-03 ASSESSMENT — PAIN DESCRIPTION - ORIENTATION: ORIENTATION: MID

## 2025-03-03 ASSESSMENT — PAIN SCALES - GENERAL: PAINLEVEL_OUTOF10: 5 - MODERATE PAIN

## 2025-03-03 NOTE — ED PROVIDER NOTES
EMERGENCY DEPARTMENT ENCOUNTER      Pt Name: Tanika Buckner  MRN: [id number]  Birthdate 1975  Date of evaluation: 3/3/2025  Provider: Ember Harrison DO         Chief Complaint   Patient presents with    Chest Pain       HPI     50-year-old female presents to the emergency department after being woke from sleep by substernal chest tightness that radiated into her right jaw.  She stated that it then resolved and she felt within her left jaw briefly, she tried to take 2 Tums even though it did not exactly feel like her normal heartburn pain, and when this did not resolve it, she decided to present for evaluation.  At this time, she denies any for the medications, has no shortness of breath, she states that it was associated with some sweatiness, but she is not currently nauseated, has not vomited, has not been sick lately although she does have sick contacts at work.              Patient History   Past Medical History:   Diagnosis Date    Anemia     C. difficile diarrhea     Migraine     Urinary tract infection ?    Varicella As a kid     Past Surgical History:   Procedure Laterality Date     SECTION, LOW TRANSVERSE      x 2    EYE SURGERY      PRK     Family History   Problem Relation Name Age of Onset    Hypertension Mother Hoda Royal     Other (varicose veins) Mother Hoda Royal     Other (open heart surgery) Mother Hoda Royal 46        x2 SHANNON    Atrial fibrillation Mother Hoda Royal     Miscarriages / Stillbirths Mother Hoda Royal     Other (varicose veins) Father      Skin cancer Father      Asthma Sister Danya Cruz     Ovarian cancer Sister Danya Cruz 46        dx 2024    Breast cancer Paternal Grandmother Shaquille Royal 45    Prostate cancer Paternal Grandfather      Arthritis Other grandfather     Breast cancer Other grandmother 45    Hypertension Other grandmother      Social History     Tobacco Use    Smoking status: Never     Passive exposure: Never    Smokeless tobacco: Never    Vaping Use    Vaping status: Never Used   Substance Use Topics    Alcohol use: Yes     Alcohol/week: 10.0 standard drinks of alcohol     Types: 8 Cans of beer, 2 Shots of liquor per week     Comment: Just depends on what events are happening    Drug use: Never       Physical Exam   ED Triage Vitals [03/03/25 0347]   Temp Heart Rate Respirations BP   -- 89 19 (!) 136/94      Pulse Ox Temp Source Heart Rate Source Patient Position   100 % Oral -- Sitting      BP Location FiO2 (%)     Left arm --       Physical Exam  Vitals and nursing note reviewed.   Constitutional:       General: She is not in acute distress.     Appearance: She is well-developed.   HENT:      Head: Normocephalic and atraumatic.   Eyes:      Conjunctiva/sclera: Conjunctivae normal.   Cardiovascular:      Rate and Rhythm: Normal rate and regular rhythm.      Heart sounds: No murmur heard.  Pulmonary:      Effort: Pulmonary effort is normal. No respiratory distress.      Breath sounds: Normal breath sounds.   Abdominal:      Palpations: Abdomen is soft.      Tenderness: There is no abdominal tenderness.   Musculoskeletal:         General: No swelling.      Cervical back: Neck supple.   Skin:     General: Skin is warm and dry.      Capillary Refill: Capillary refill takes less than 2 seconds.   Neurological:      Mental Status: She is alert.   Psychiatric:         Mood and Affect: Mood normal.         Results:  Abnormal Labs Reviewed   COMPREHENSIVE METABOLIC PANEL - Abnormal; Notable for the following components:       Result Value    Glucose 101 (*)     All other components within normal limits   CBC WITH AUTO DIFFERENTIAL - Abnormal; Notable for the following components:    Hemoglobin 11.4 (*)     MCHC 31.7 (*)     RDW 17.2 (*)     Lymphocytes Absolute 1.13 (*)     All other components within normal limits       All other labs were normal or not returned as of the time of this dictation.     XR chest 1 view   Final Result   1.  No focal  consolidation identified.                  MACRO:   None        Signed by: Amaya Ramos 3/3/2025 6:28 AM   Dictation workstation:   [id number]            ED Course & MDM   Tanika Buckner is a 50 y.o. female presenting to the ED for evaluation of had concerns including Chest Pain.. External records reviewed: I reviewed external records including outpatient, PCP records, and prior discharge summaries.    Medical Decision Making  Cardiac workup initiated. She has no leukocytosis, her hemoglobin is 11.4, she has no thrombocytopenia.  She has no significant electrolyte derangements, normal kidney and liver function.  Initial troponin is negative.        ED Course as of 03/05/25 0704   Mon Mar 03, 2025   0518 EKG performed at 0 354 and read by me shows sinus rhythm, 89 bpm, normal CO interval, normal QRS, QTc of 430, with an upright axis and no acute injury pattern [AS]   0547 Troponin I, High Sensitivity: <3 [AS]   0547 Signed out to the oncoming provider pending repeat troponin and ultimate disposition [AS]      ED Course User Index  [AS] Ember Harrison DO         Diagnoses as of 03/05/25 0704   Chest pain, unspecified type           Procedure  Procedures            Ember Harrison DO  Emergency Medicine    The above documentation was completed with the use of speech recognition software. It may contain dictation errors secondary to limitations of the software.      ED Medications administered this visit:  Medications - No data to display    New Prescriptions from this visit:    Discharge Medication List as of 3/3/2025  6:54 AM          Final Impression:   1. Chest pain, unspecified type          (Please note that portions of this note were completed with a voice recognition program.  Efforts were made to edit the dictations but occasionally words are mis-transcribed.)     Ember Harrison DO  03/03/25 0557       Ember Harrison DO  03/05/25 0704

## 2025-03-03 NOTE — ED PROVIDER NOTES
Patient was initially seen by my colleague and endorsed to me on signout.    Briefly, patient is a 50-year-old female that presents to the emergency department for evaluation of chest pain.  Patient reports that she had some epigastric discomfort which she thought was indigestion however believes it radiated up into her jaw briefly.  Pain has improved at this time and she has no prior cardiac history.  Patient was signed out to me pending repeat troponin prior to final disposition.    Exam  General: Awake, resting comfortably, no obvious distress  HEENT: Head is normocephalic, atraumatic, moist because membranes, extraocular eye movements intact  Cardiac: Regular rate and rhythm  Pulmonary: Respirations easy, nonlabored, speaking in full sentences  Extremities: No peripheral pitting edema    Repeat troponin was less than 3.  Patient asymptomatic at time of my evaluation.  She has a heart score of 3 and is considered very low risk for major cardiac event.  Patient to follow-up with cardiology as an outpatient.  Return precautions were discussed with patient.     Usman Levin,   03/03/25 0638

## 2025-03-03 NOTE — DISCHARGE INSTRUCTIONS
Follow-up with your cardiologist as we discussed by calling their office today to schedule close follow-up appointment.  If symptoms worsen or change he can return at any time for further evaluation and treatment.

## 2025-03-03 NOTE — ED TRIAGE NOTES
To ED from home, pt c/o acute onset of mid-sternal CP of a tightness sensation that is continuous and woke her from her sleep at 0230 this am. Pt states she thought it was indigestion, took 2 TUMS, but reports no relief. Denies SOB. States she did have some right sided dental pain then it switched to the left side. The dental pain was at the same time as the chest pain onset. States the dental pain has subsided. Pain currently 5/10.    EKG in triage @ 0354: NSR 89bpm

## 2025-03-10 ENCOUNTER — DOCUMENTATION (OUTPATIENT)
Dept: GASTROENTEROLOGY | Facility: CLINIC | Age: 50
End: 2025-03-10
Payer: COMMERCIAL

## 2025-03-10 DIAGNOSIS — K21.9 GASTROESOPHAGEAL REFLUX DISEASE WITHOUT ESOPHAGITIS: Primary | ICD-10-CM

## 2025-03-10 RX ORDER — PANTOPRAZOLE SODIUM 40 MG/1
40 TABLET, DELAYED RELEASE ORAL DAILY
Qty: 30 TABLET | Refills: 2 | Status: SHIPPED | OUTPATIENT
Start: 2025-03-10 | End: 2025-06-08

## 2025-03-10 NOTE — PROGRESS NOTES
Called patient regarding recent ED visit for chest pain, located substernal with associated headache and self resolved right axially discomfort. Pain for last week. EKG and troponins normal.   Does not feel like typical GERD. Also has chronic throat clearing. Will trial PPI for 4-8 weeks. F.up cards. If no improvement recommend EGD for eval. If worsening of pain to return to ED

## 2025-05-07 ASSESSMENT — PROMIS GLOBAL HEALTH SCALE
RATE_MENTAL_HEALTH: GOOD
CARRYOUT_PHYSICAL_ACTIVITIES: COMPLETELY
RATE_GENERAL_HEALTH: GOOD
EMOTIONAL_PROBLEMS: SOMETIMES
RATE_AVERAGE_FATIGUE: MILD
RATE_PHYSICAL_HEALTH: GOOD
RATE_QUALITY_OF_LIFE: GOOD
RATE_SOCIAL_SATISFACTION: GOOD
CARRYOUT_SOCIAL_ACTIVITIES: GOOD
RATE_AVERAGE_PAIN: 1

## 2025-05-14 ENCOUNTER — OFFICE VISIT (OUTPATIENT)
Dept: PRIMARY CARE | Facility: CLINIC | Age: 50
End: 2025-05-14
Payer: COMMERCIAL

## 2025-05-14 VITALS
BODY MASS INDEX: 23.95 KG/M2 | SYSTOLIC BLOOD PRESSURE: 112 MMHG | HEART RATE: 90 BPM | RESPIRATION RATE: 16 BRPM | OXYGEN SATURATION: 98 % | DIASTOLIC BLOOD PRESSURE: 72 MMHG | HEIGHT: 66 IN | WEIGHT: 149 LBS

## 2025-05-14 DIAGNOSIS — Z00.00 ROUTINE GENERAL MEDICAL EXAMINATION AT A HEALTH CARE FACILITY: Primary | ICD-10-CM

## 2025-05-14 DIAGNOSIS — F32.A ANXIETY AND DEPRESSION: ICD-10-CM

## 2025-05-14 DIAGNOSIS — Z23 ENCOUNTER FOR IMMUNIZATION: ICD-10-CM

## 2025-05-14 DIAGNOSIS — Z80.41 FAMILY HISTORY OF OVARIAN CANCER: ICD-10-CM

## 2025-05-14 DIAGNOSIS — D50.8 IRON DEFICIENCY ANEMIA SECONDARY TO INADEQUATE DIETARY IRON INTAKE: ICD-10-CM

## 2025-05-14 DIAGNOSIS — F41.9 ANXIETY AND DEPRESSION: ICD-10-CM

## 2025-05-14 RX ORDER — HYDROXYZINE HYDROCHLORIDE 25 MG/1
25 TABLET, FILM COATED ORAL EVERY 8 HOURS PRN
Qty: 90 TABLET | Refills: 11 | Status: SHIPPED | OUTPATIENT
Start: 2025-05-14 | End: 2026-05-09

## 2025-05-14 RX ORDER — FERROUS SULFATE 325(65) MG
325 TABLET, DELAYED RELEASE (ENTERIC COATED) ORAL
Qty: 90 TABLET | Refills: 3 | Status: SHIPPED | OUTPATIENT
Start: 2025-05-14 | End: 2026-05-14

## 2025-05-14 RX ORDER — SERTRALINE HYDROCHLORIDE 50 MG/1
50 TABLET, FILM COATED ORAL DAILY
Qty: 90 TABLET | Refills: 3 | Status: SHIPPED | OUTPATIENT
Start: 2025-05-14 | End: 2026-05-14

## 2025-05-14 ASSESSMENT — ENCOUNTER SYMPTOMS
SLEEP DISTURBANCE: 0
WEAKNESS: 0
DIZZINESS: 0
FATIGUE: 0
VOMITING: 0
HEADACHES: 0
DIFFICULTY URINATING: 0
ARTHRALGIAS: 0
DYSURIA: 0
FREQUENCY: 0
NAUSEA: 0
SHORTNESS OF BREATH: 0
ABDOMINAL PAIN: 0
CONSTIPATION: 0
SORE THROAT: 0
JOINT SWELLING: 0
SINUS PAIN: 0
HEMATURIA: 0
COUGH: 0
PALPITATIONS: 0
LOSS OF SENSATION IN FEET: 0
NERVOUS/ANXIOUS: 1
OCCASIONAL FEELINGS OF UNSTEADINESS: 0
DEPRESSION: 0
RHINORRHEA: 0
FEVER: 0
BLOOD IN STOOL: 1
APPETITE CHANGE: 0
CHILLS: 0
DIARRHEA: 0

## 2025-05-14 ASSESSMENT — PATIENT HEALTH QUESTIONNAIRE - PHQ9
1. LITTLE INTEREST OR PLEASURE IN DOING THINGS: NOT AT ALL
2. FEELING DOWN, DEPRESSED OR HOPELESS: SEVERAL DAYS
10. IF YOU CHECKED OFF ANY PROBLEMS, HOW DIFFICULT HAVE THESE PROBLEMS MADE IT FOR YOU TO DO YOUR WORK, TAKE CARE OF THINGS AT HOME, OR GET ALONG WITH OTHER PEOPLE: SOMEWHAT DIFFICULT
2. FEELING DOWN, DEPRESSED OR HOPELESS: NOT AT ALL
1. LITTLE INTEREST OR PLEASURE IN DOING THINGS: NOT AT ALL
SUM OF ALL RESPONSES TO PHQ9 QUESTIONS 1 AND 2: 0
SUM OF ALL RESPONSES TO PHQ9 QUESTIONS 1 AND 2: 1

## 2025-05-14 ASSESSMENT — PAIN SCALES - GENERAL: PAINLEVEL_OUTOF10: 0-NO PAIN

## 2025-05-14 NOTE — PROGRESS NOTES
"Subjective   Patient ID: Tanika Buckner is a 50 y.o. female who presents for Annual Exam.    Patient has been under some stress due to recent life stress factors. Sister was recently diagnosed with ovarian cancer. Doesn't take an iron supplement. Tried getting banding for hemorrhoids but didn't get much relief. Is up to date on all vaccines except for  both doses of shingrix. Sees ophthalmologist and dentist regularly.    Diagnostics Reviewed: 12/30/2022 Colonoscopy: Revealed skin tags, diverticulosis and hemorrhoids.  Repeat colonoscopy in 10 years.           5/8/2024 US Pelvis/Transvaginal: Was normal.          2/12/2025 BI Mammo Bilateral: Was normal.   Labs Reviewed: 3/3/2025 Blood Work: hemoglobin 11.4 and glucose 101. 3 but otherwise normal.          Review of Systems   Constitutional:  Negative for appetite change, chills, fatigue and fever.   HENT:  Negative for ear pain, rhinorrhea, sinus pain and sore throat.    Eyes:  Negative for visual disturbance.   Respiratory:  Negative for cough and shortness of breath.    Cardiovascular:  Negative for chest pain and palpitations.   Gastrointestinal:  Positive for blood in stool (hemorrhoids). Negative for abdominal pain, constipation, diarrhea, nausea and vomiting.   Genitourinary:  Negative for difficulty urinating, dysuria, frequency and hematuria.   Musculoskeletal:  Negative for arthralgias and joint swelling.   Skin:  Negative for rash.   Neurological:  Negative for dizziness, weakness and headaches.   Psychiatric/Behavioral:  Positive for behavioral problems (depression). Negative for sleep disturbance. The patient is nervous/anxious.      Objective   /72   Pulse 90   Resp 16   Ht 1.664 m (5' 5.5\")   Wt 67.6 kg (149 lb)   SpO2 98%   BMI 24.42 kg/m²     Physical Exam  HENT:      Right Ear: Tympanic membrane normal.      Left Ear: Tympanic membrane normal.      Mouth/Throat:      Pharynx: Oropharynx is clear. No oropharyngeal exudate.   Eyes:      " Conjunctiva/sclera: Conjunctivae normal.      Pupils: Pupils are equal, round, and reactive to light.   Neck:      Thyroid: No thyromegaly.      Vascular: No carotid bruit.   Cardiovascular:      Rate and Rhythm: Regular rhythm.      Heart sounds: Normal heart sounds.   Pulmonary:      Breath sounds: Normal breath sounds.   Chest:   Breasts:     Right: Normal.      Left: Normal.   Abdominal:      Palpations: Abdomen is soft. There is no hepatomegaly.      Tenderness: There is no abdominal tenderness.   Musculoskeletal:      Right lower leg: No edema.      Left lower leg: No edema.   Lymphadenopathy:      Cervical: No cervical adenopathy.   Skin:     General: Skin is warm.      Comments: No suspicious moles   Neurological:      Mental Status: She is alert and oriented to person, place, and time.      Gait: Gait is intact.   Psychiatric:         Mood and Affect: Mood and affect normal.         Behavior: Behavior normal. Behavior is cooperative.         Cognition and Memory: Cognition normal.       Assessment/Plan   There are no diagnoses linked to this encounter.    Scribe Attestation  By signing my name below, ISathya, Scribterese   attest that this documentation has been prepared under the direction and in the presence of Alpa Lin MD.            attest that this documentation has been prepared under the direction and in the presence of Alpa Lin MD.

## 2025-05-21 ENCOUNTER — HOSPITAL ENCOUNTER (OUTPATIENT)
Dept: RADIOLOGY | Facility: CLINIC | Age: 50
Discharge: HOME | End: 2025-05-21
Payer: COMMERCIAL

## 2025-05-21 DIAGNOSIS — Z80.41 FAMILY HISTORY OF OVARIAN CANCER: ICD-10-CM

## 2025-05-21 DIAGNOSIS — R10.2 PELVIC PRESSURE IN FEMALE: ICD-10-CM

## 2025-05-21 PROCEDURE — 76856 US EXAM PELVIC COMPLETE: CPT

## 2025-05-21 PROCEDURE — 76856 US EXAM PELVIC COMPLETE: CPT | Performed by: RADIOLOGY

## 2025-05-21 PROCEDURE — 76830 TRANSVAGINAL US NON-OB: CPT | Performed by: RADIOLOGY

## 2025-09-04 DIAGNOSIS — M19.032 ARTHRITIS OF SCAPHOID-TRAPEZIUM-TRAPEZOID JOINT OF LEFT HAND: ICD-10-CM
